# Patient Record
Sex: MALE | Race: WHITE | NOT HISPANIC OR LATINO | Employment: FULL TIME | URBAN - METROPOLITAN AREA
[De-identification: names, ages, dates, MRNs, and addresses within clinical notes are randomized per-mention and may not be internally consistent; named-entity substitution may affect disease eponyms.]

---

## 2020-04-15 ENCOUNTER — OFFICE VISIT (OUTPATIENT)
Dept: URGENT CARE | Facility: CLINIC | Age: 25
End: 2020-04-15
Payer: COMMERCIAL

## 2020-04-15 VITALS
DIASTOLIC BLOOD PRESSURE: 76 MMHG | TEMPERATURE: 98.3 F | RESPIRATION RATE: 12 BRPM | OXYGEN SATURATION: 98 % | HEIGHT: 70 IN | HEART RATE: 63 BPM | SYSTOLIC BLOOD PRESSURE: 122 MMHG | WEIGHT: 174 LBS | BODY MASS INDEX: 24.91 KG/M2

## 2020-04-15 DIAGNOSIS — H10.9 CONJUNCTIVITIS OF RIGHT EYE, UNSPECIFIED CONJUNCTIVITIS TYPE: ICD-10-CM

## 2020-04-15 DIAGNOSIS — R06.00 DYSPNEA, UNSPECIFIED TYPE: ICD-10-CM

## 2020-04-15 DIAGNOSIS — F41.8 ANXIETY ABOUT HEALTH: ICD-10-CM

## 2020-04-15 PROCEDURE — 99203 OFFICE O/P NEW LOW 30 MIN: CPT | Performed by: PHYSICIAN ASSISTANT

## 2020-04-15 RX ORDER — ALBUTEROL SULFATE 90 UG/1
2 AEROSOL, METERED RESPIRATORY (INHALATION) EVERY 6 HOURS PRN
Qty: 8.5 G | Refills: 0 | Status: SHIPPED | OUTPATIENT
Start: 2020-04-15

## 2020-04-15 RX ORDER — POLYMYXIN B SULFATE AND TRIMETHOPRIM 1; 10000 MG/ML; [USP'U]/ML
1 SOLUTION OPHTHALMIC 4 TIMES DAILY
Qty: 1 BOTTLE | Refills: 0 | Status: SHIPPED | OUTPATIENT
Start: 2020-04-15 | End: 2020-04-20

## 2020-04-15 ASSESSMENT — ENCOUNTER SYMPTOMS: SHORTNESS OF BREATH: 1

## 2020-04-15 NOTE — PROGRESS NOTES
Subjective:      Jalen Singh is a 24 y.o. male who presents with Eye Problem; Asthma; and Shortness of Breath    PMH: Reviewed with patient/family member/EPIC.    MEDS: Reviewed with patient/family member/EPIC.   Current Outpatient Medications: none  ALLERGIES: No Known Allergies  SURGHX: History reviewed. No pertinent surgical history.  SOCHX:  reports that he has never smoked. He has never used smokeless tobacco. He reports current alcohol use. He reports current drug use. Drug: Marijuana.  FH: Reviewed with patient, not pertinent to this visit.             Patient presents with:  Eye Problem: drainage from right eye x 3 days. Has been using some old drops with little relief. Pt does not wear contact lenses.     Asthma and some intermittent Shortness of Breath after starting a new medication 3-4 weeks ago.  Pt took 2 doses only, stopped that medicine completely and was not switched to any other medication in its place.  Pt does have a history of anxiety but this does not feel the same. Pt denies swelling to lips, nose, tongue throat or rash.  Pt is almost out of his albuterol.     Pt was tested for COVID 3 days ago by health department (negative) due to the duration of his SOB. PT is not currently working, but one roommate is working. Pt has no known +COVID exposure.     Pt has not tried any otc allergy medications but does admit some seasonal allergies.  Pt admits to smoking marijuana but figured it was not helping his symptoms so he hasn't smoked it in a few days.      Pt denies any other complaints.         Eye Problem    The right eye is affected. This is a new problem. Episode onset: 3 days. The problem occurs constantly. The problem has been gradually worsening. There was no injury mechanism. The pain is at a severity of 3/10. The pain is mild. There is no known exposure to pink eye. He does not wear contacts. Associated symptoms include an eye discharge and eye redness. Pertinent negatives include no  blurred vision, double vision, fever, foreign body sensation, nausea, photophobia or recent URI. He has tried eye drops for the symptoms. The treatment provided no relief.   Asthma   He complains of chest tightness and shortness of breath. There is no cough, difficulty breathing, frequent throat clearing, hemoptysis, sputum production or wheezing. This is a new problem. The current episode started 1 to 4 weeks ago. The problem occurs daily. The problem has been waxing and waning. Associated symptoms include dyspnea on exertion and rhinorrhea. Pertinent negatives include no appetite change, chest pain, fever, headaches, malaise/fatigue, nasal congestion, orthopnea, PND, postnasal drip, sneezing, sore throat, sweats or trouble swallowing. His symptoms are aggravated by emotional stress, minimal activity and strenuous activity. His symptoms are alleviated by beta-agonist and rest. He reports minimal improvement on treatment. His symptoms are not alleviated by anxiolytic and beta-agonist. His past medical history is significant for asthma.       Review of Systems   Constitutional: Negative for appetite change, fever and malaise/fatigue.   HENT: Positive for congestion and rhinorrhea. Negative for postnasal drip, sneezing, sore throat and trouble swallowing.    Eyes: Positive for discharge and redness. Negative for blurred vision, double vision, photophobia and pain.   Respiratory: Positive for shortness of breath. Negative for cough, hemoptysis, sputum production and wheezing.    Cardiovascular: Positive for dyspnea on exertion. Negative for chest pain and PND.   Gastrointestinal: Negative for nausea.   Neurological: Negative for headaches.   Endo/Heme/Allergies: Positive for environmental allergies.   Psychiatric/Behavioral: The patient is nervous/anxious and has insomnia.    All other systems reviewed and are negative.         Objective:     /76   Pulse 63   Temp 36.8 °C (98.3 °F)   Resp 12   Ht 1.778 m (5'  "10\")   Wt 78.9 kg (174 lb)   SpO2 98%   BMI 24.97 kg/m²      Physical Exam  Vitals signs and nursing note reviewed.   Constitutional:       General: He is not in acute distress.     Appearance: Normal appearance. He is well-developed. He is not ill-appearing, toxic-appearing or diaphoretic.   HENT:      Head: Normocephalic and atraumatic.      Right Ear: Tympanic membrane normal.      Left Ear: Tympanic membrane normal.      Nose: Nose normal.      Mouth/Throat:      Lips: Pink.      Mouth: Mucous membranes are moist.   Eyes:      General: Lids are normal. Lids are everted, no foreign bodies appreciated. Vision grossly intact. Gaze aligned appropriately. No allergic shiner or scleral icterus.        Right eye: Discharge present. No foreign body or hordeolum.         Left eye: No discharge.      Extraocular Movements: Extraocular movements intact.      Conjunctiva/sclera:      Right eye: Right conjunctiva is injected.      Left eye: Left conjunctiva is not injected.      Pupils: Pupils are equal, round, and reactive to light.   Neck:      Musculoskeletal: Normal range of motion and neck supple.      Vascular: No JVD.   Cardiovascular:      Rate and Rhythm: Normal rate and regular rhythm.      Pulses: Normal pulses.      Heart sounds: Normal heart sounds.   Pulmonary:      Effort: Pulmonary effort is normal. No respiratory distress.      Breath sounds: Normal breath sounds and air entry. No decreased air movement.   Abdominal:      Palpations: Abdomen is soft.   Musculoskeletal: Normal range of motion.   Lymphadenopathy:      Cervical: No cervical adenopathy.   Skin:     General: Skin is warm and dry.      Capillary Refill: Capillary refill takes less than 2 seconds.   Neurological:      Mental Status: He is alert and oriented to person, place, and time.      Gait: Gait normal.   Psychiatric:         Attention and Perception: Attention and perception normal.         Mood and Affect: Mood is anxious.         Speech: " Speech normal.         Behavior: Behavior is cooperative.         Thought Content: Thought content normal.         Cognition and Memory: Cognition normal.         Judgment: Judgment normal.                 Assessment/Plan:     1. Conjunctivitis of right eye, unspecified conjunctivitis type  polymixin-trimethoprim (POLYTRIM) 47530-9.1 UNIT/ML-% Solution    albuterol 108 (90 Base) MCG/ACT Aero Soln inhalation aerosol   2. Dyspnea, unspecified type  albuterol 108 (90 Base) MCG/ACT Aero Soln inhalation aerosol   3. Anxiety about health       PT can use cool/warm compress on affected area for relief of symptoms.     PT was instructed to begin a daily OTC allergy medication for relief of allergy symptoms.  Ex: allegra, claritin, zyrtec, allerclear, etc.   Pt can also take OTC benadryl at bedtime if symptoms are keeping them awake at night.     Per protocol for PUI/ISO patients, the patient was evaluated by me while I was wearing PPE.      Per CDC guidelines, patient has been instructed to self quarantine at home for at least 7 days from onset of symptoms and at least 3 full days after resolution of fever/respiratory symptoms.  PT verbalized agreement to do so.      PT should follow up with PCP in 1-2 days for re-evaluation if symptoms have not improved.  Discussed red flags and reasons to return to UC or ED.  Pt and/or family verbalized understanding of diagnosis and follow up instructions and was offered informational handout on diagnosis.  PT discharged.

## 2020-04-17 SDOH — HEALTH STABILITY: MENTAL HEALTH: HOW OFTEN DO YOU HAVE A DRINK CONTAINING ALCOHOL?: MONTHLY OR LESS

## 2020-04-17 ASSESSMENT — ENCOUNTER SYMPTOMS
RHINORRHEA: 1
WHEEZING: 0
SPUTUM PRODUCTION: 0
PHOTOPHOBIA: 0
FOREIGN BODY SENSATION: 0
NERVOUS/ANXIOUS: 1
SWEATS: 0
COUGH: 0
NAUSEA: 0
FREQUENT THROAT CLEARING: 0
HEADACHES: 0
APPETITE CHANGE: 0
EYE REDNESS: 1
SORE THROAT: 0
PND: 0
TROUBLE SWALLOWING: 0
HEMOPTYSIS: 0
EYE PAIN: 0
DYSPNEA ON EXERTION: 1
EYE DISCHARGE: 1
FEVER: 0
INSOMNIA: 1
CHEST TIGHTNESS: 1
ORTHOPNEA: 0
BLURRED VISION: 0
DIFFICULTY BREATHING: 0
DOUBLE VISION: 0

## 2020-04-17 ASSESSMENT — VISUAL ACUITY: OU: 1

## 2020-06-03 ENCOUNTER — HOSPITAL ENCOUNTER (EMERGENCY)
Facility: MEDICAL CENTER | Age: 25
End: 2020-06-03
Attending: EMERGENCY MEDICINE
Payer: COMMERCIAL

## 2020-06-03 VITALS
BODY MASS INDEX: 25.88 KG/M2 | DIASTOLIC BLOOD PRESSURE: 68 MMHG | TEMPERATURE: 97.7 F | OXYGEN SATURATION: 97 % | HEART RATE: 66 BPM | WEIGHT: 180.78 LBS | SYSTOLIC BLOOD PRESSURE: 117 MMHG | HEIGHT: 70 IN | RESPIRATION RATE: 18 BRPM

## 2020-06-03 DIAGNOSIS — S61.411A LACERATION OF RIGHT HAND INVOLVING TENDON, INITIAL ENCOUNTER: ICD-10-CM

## 2020-06-03 DIAGNOSIS — S66.921A LACERATION OF RIGHT HAND INVOLVING TENDON, INITIAL ENCOUNTER: ICD-10-CM

## 2020-06-03 PROCEDURE — 303747 HCHG EXTRA SUTURE

## 2020-06-03 PROCEDURE — 304999 HCHG REPAIR-SIMPLE/INTERMED LEVEL 1

## 2020-06-03 PROCEDURE — 99283 EMERGENCY DEPT VISIT LOW MDM: CPT

## 2020-06-03 PROCEDURE — 304217 HCHG IRRIGATION SYSTEM

## 2020-06-03 PROCEDURE — 700101 HCHG RX REV CODE 250: Performed by: EMERGENCY MEDICINE

## 2020-06-03 RX ORDER — CEPHALEXIN 500 MG/1
500 CAPSULE ORAL 4 TIMES DAILY
Qty: 28 CAP | Refills: 0 | Status: SHIPPED | OUTPATIENT
Start: 2020-06-03 | End: 2020-06-10

## 2020-06-03 RX ORDER — BUPIVACAINE HYDROCHLORIDE AND EPINEPHRINE 5; 5 MG/ML; UG/ML
10 INJECTION, SOLUTION EPIDURAL; INTRACAUDAL; PERINEURAL ONCE
Status: COMPLETED | OUTPATIENT
Start: 2020-06-03 | End: 2020-06-03

## 2020-06-03 RX ADMIN — BUPIVACAINE HYDROCHLORIDE AND EPINEPHRINE BITARTRATE 10 ML: 5; .005 INJECTION, SOLUTION EPIDURAL; INTRACAUDAL; PERINEURAL at 12:16

## 2020-06-03 NOTE — ED NOTES
ERP sutured wound and placed dressing and splint.  Patient educated on wound and dressing care, verbalized understanding.   Graft Donor Site Bandage (Optional-Leave Blank If You Don't Want In Note): Steri-strips and a pressure bandage were applied to the donor site.

## 2020-06-03 NOTE — ED PROVIDER NOTES
ED Provider Note    CHIEF COMPLAINT  Chief Complaint   Patient presents with   • T-5000 Lacerations     on sharp metal edge of steel item. Right 2nd digit. Bandaged from concentra and sent to ED. He states he is able to flex and extend index finger       HPI  Jalen Singh is a 24 y.o. male who presents evaluation of right hand laceration with tendon involvement.  The patient reports excellently cut the dorsal aspect of his right hand while at work.  It came across a sharp object.  He sustained a 1 cm transverse laceration overlying the second MCP.  There is concern about partial or complete tendon disruption as well.  He denies any numbness weakness or tingling.  Tetanus is up-to-date within the last 10 years.  Injury occurred within the last 3 hours  REVIEW OF SYSTEMS  See HPI for further details.  No numbness tingling weakness all other systems are negative.     PAST MEDICAL HISTORY  Past Medical History:   Diagnosis Date   • Depression        FAMILY HISTORY  Noncontributory    SOCIAL HISTORY  Social History     Socioeconomic History   • Marital status: Single     Spouse name: Not on file   • Number of children: Not on file   • Years of education: Not on file   • Highest education level: Not on file   Occupational History   • Not on file   Social Needs   • Financial resource strain: Not on file   • Food insecurity     Worry: Not on file     Inability: Not on file   • Transportation needs     Medical: Not on file     Non-medical: Not on file   Tobacco Use   • Smoking status: Never Smoker   • Smokeless tobacco: Never Used   Substance and Sexual Activity   • Alcohol use: Yes     Frequency: Monthly or less   • Drug use: Yes     Types: Marijuana, Inhaled     Comment: daily marijuana   • Sexual activity: Not on file   Lifestyle   • Physical activity     Days per week: Not on file     Minutes per session: Not on file   • Stress: Not on file   Relationships   • Social connections     Talks on phone: Not on file     Gets  "together: Not on file     Attends Christian service: Not on file     Active member of club or organization: Not on file     Attends meetings of clubs or organizations: Not on file     Relationship status: Not on file   • Intimate partner violence     Fear of current or ex partner: Not on file     Emotionally abused: Not on file     Physically abused: Not on file     Forced sexual activity: Not on file   Other Topics Concern   • Not on file   Social History Narrative   • Not on file     Marijuana  SURGICAL HISTORY  No past surgical history on file.    CURRENT MEDICATIONS  Home Medications     Reviewed by Jose Epstein R.N. (Registered Nurse) on 06/03/20 at 1132  Med List Status: Complete   Medication Last Dose Status   albuterol 108 (90 Base) MCG/ACT Aero Soln inhalation aerosol prn Active                ALLERGIES  No Known Allergies    PHYSICAL EXAM  VITAL SIGNS: /81   Pulse 70   Temp 36.6 °C (97.9 °F) (Temporal)   Resp 16   Ht 1.778 m (5' 10\")   Wt 82 kg (180 lb 12.4 oz)   SpO2 100%   BMI 25.94 kg/m²       Constitutional: Well developed, Well nourished, No acute distress, Non-toxic appearance.   HENT: Normocephalic, Atraumatic, Bilateral external ears normal, Oropharynx moist, No oral exudates, Nose normal.   Eyes: PERRLA, EOMI, Conjunctiva normal, No discharge.   Neck: Normal range of motion, No tenderness, Supple, No stridor.   Cardiovascular: Normal heart rate, Normal rhythm, No murmurs, No rubs, No gallops.   Thorax & Lungs: Normal breath sounds, No respiratory distress, No wheezing, No chest tenderness.   Abdomen: Bowel sounds normal, Soft, No tenderness, No masses, No pulsatile masses.   Skin: Warm, Dry, No erythema, No rash.   Back: No tenderness, No CVA tenderness.   Extremities: Jb is notable for a transverse laceration overlying the dorsal aspect of the right MCP second digit.  Two-point discrimination is normal sensation is intact.  The patient can flex and extend against resistance " however wound exploration reveals likely partial transection of the central slip of the extensor tendon   neurologic: Alert & oriented x 3, Normal motor function, Normal sensory function, No focal deficits noted.   Psychiatric: Shows      Procedure: Centimeter hand laceration repair the wound was anesthetized with a total of 4 cc of bupivacaine with epinephrine.  He was copiously irrigated with sterile saline.  Sterile prep and drape.  The wound was gently explored.  There does appear to be a partial transection of the extensor tendon without foreign body.  The overlying skin was closed with a total of 3, five-point 0 nylon sutures.  No complications    COURSE & MEDICAL DECISION MAKING  Pertinent Labs & Imaging studies reviewed. (See chart for details)  I discussed this case with Dr. Gatica with hand surgery.  He specifically recommended aggressive washout and primary skin closure and to leave the attendant alone.  I will place him in a splint and place him on Keflex.  Follow-up with orthopedics in 1 to 2 days    FINAL IMPRESSION  1.   1. Laceration of right hand involving tendon, initial encounter                Electronically signed by: Mo Weinberg M.D., 6/3/2020 12:56 PM

## 2020-06-03 NOTE — LETTER
"  FORM C-4:  EMPLOYEE’S CLAIM FOR COMPENSATION/ REPORT OF INITIAL TREATMENT  EMPLOYEE’S CLAIM - PROVIDE ALL INFORMATION REQUESTED   First Name Jalen Last Name Authier Birthdate 1995  Sex male Claim Number   Home Employee Address [address] [facility]                                     Zip  [address] Height  1.778 m (5' 10\") Weight  82 kg (180 lb 12.4 oz) N  [id number]   Mailing Employee Address [address] [facility]               Zip  [address] Telephone  [phone] (home)  Primary Language Spoken  English   Insurer   Third Party   [employer]  Employee's Occupation (Job Title) When Injury or Occupational Disease Occurred     Employer's Name [employer] Telephone [phone]    Employer Address [address] [facility] [address] Zip [address]   Date of Injury  6/3/2020       Hour of Injury  10:00 AM Date Employer Notified  6/3/2020 Last Day of Work after Injury or Occupational Disease  6/3/2020 Supervisor to Whom Injury Reported  Armando Haq   Address or Location of Accident (if applicable) [[address] [place] [facility]]   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 Be specific and answer in detail. Use additional sheet if necessary)  Cut R knuckkle for index finger on sharp piecve of steel - Winnie fuentes tendon was cut   If you believe that you have an occupational disease, when did you first have knowledge of the disability and it relationship to your employment? n/a Witnesses to the Accident  none - reported immediatley to supervisor   Nature of Injury or Occupational Disease  Laceration Part(s) of Body Injured or Affected  Finger (R), Soft Tissue, N/A    I CERTIFY THAT THE ABOVE IS TRUE AND CORRECT TO THE BEST OF MY KNOWLEDGE AND THAT I HAVE PROVIDED THIS INFORMATION IN ORDER TO OBTAIN THE BENEFITS OF [place] " [place]  I HEREBY AUTHORIZE ANY PHYSICIAN, CHIROPRACTOR, SURGEON, PRACTITIONER, OR OTHER PERSON, ANY HOSPITAL, INCLUDING [facility] OR [facility], ANY MEDICAL SERVICE ORGANIZATION, ANY INSURANCE COMPANY, OR OTHER INSTITUTION OR ORGANIZATION TO RELEASE TO EACH OTHER, ANY MEDICAL OR OTHER INFORMATION, INCLUDING BENEFITS PAID OR PAYABLE, PERTINENT TO THIS INJURY OR DISEASE, EXCEPT INFORMATION RELATIVE TO DIAGNOSIS, TREATMENT AND/OR COUNSELING FOR AIDS, PSYCHOLOGICAL CONDITIONS, ALCOHOL OR CONTROLLED SUBSTANCES, FOR WHICH I MUST GIVE SPECIFIC AUTHORIZATION.  A PHOTOSTAT OF THIS AUTHORIZATION SHALL BE AS VALID AS THE ORIGINAL.  Date       06/03/2020         Place           [facility] [place]                                                     Employee’s Signature   THIS REPORT MUST BE COMPLETED AND MAILED WITHIN 3 WORKING DAYS OF TREATMENT   Place [facility], EMERGENCY DEPT                                                                             Name of Facility [facility]   Date  6/3/2020 Diagnosis  (S61.411A,  S66.921A) Laceration of right hand involving tendon, initial encounter Is there evidence the injured employee was under the influence of alcohol and/or another controlled substance at the time of accident?   Hour  1:11 PM Description of Injury or Disease  Laceration of right hand involving tendon, initial encounter No   Treatment  Physician evaluation and treatment of right hand laceration with extensor tendon partial transection  Have you advised the patient to remain off work five days or more?         No   X-Ray Findings  Negative If Yes   From Date    To Date      From information given by the employee, together with medical evidence, can you directly connect this injury or occupational disease as job incurred? Yes If No, is employee capable of: Full Duty  No Modified Duty  Yes   Is additional medical  "care by a physician indicated? Yes  Comments:Follow-up with hand surgery If Modified Duty, Specify any Limitations / Restrictions   Use of right hand   Do you know of any previous injury or disease contributing to this condition or occupational disease? No    Date 6/3/2020 Print Doctor’s Name Mo Weinberg I certify the employer’s copy of this form was mailed on:   Address [address] [place] [id number] [phone] INSURER’S USE ONLY   Provider’s Tax ID Number [id number] Telephone Dept: [phone]    Doctor’s Signature [employer] M.D. Degree  [facility]      Form C-4 (rev.10/07)                                                                         BRIEF DESCRIPTION OF RIGHTS AND BENEFITS  (Pursuant to [place] 616C.050)    Notice of Injury or Occupational Disease (Incident Report Form C-1): If an injury or occupational disease (OD) arises out of and in the course of employment, you must provide written notice to your employer as soon as practicable, but no later than 7 days after the accident or OD. Your employer shall maintain a sufficient supply of the required forms.    Claim for Compensation (Form C-4): If medical treatment is sought, the form C-4 is available at the place of initial treatment. A completed \"Claim for Compensation\" (Form C-4) must be filed within 90 days after an accident or OD. The treating physician or chiropractor must, within 3 working days after treatment, complete and mail to the employer, the employer's insurer and third-party , the Claim for Compensation.    Medical Treatment: If you require medical treatment for your on-the-job injury or OD, you may be required to select a physician or chiropractor from a list provided by your workers’ compensation insurer, if it has contracted with an Organization for Managed Care (MCO) or Preferred Provider Organization (PPO) or providers of health care. If your employer has not entered into a contract with an MCO or PPO, you " may select a physician or chiropractor from the Panel of Physicians and Chiropractors. Any medical costs related to your industrial injury or OD will be paid by your insurer.    Temporary Total Disability (TTD): If your doctor has certified that you are unable to work for a period of at least 5 consecutive days, or 5 cumulative days in a 20-day period, or places restrictions on you that your employer does not accommodate, you may be entitled to TTD compensation.    Temporary Partial Disability (TPD): If the wage you receive upon reemployment is less than the compensation for TTD to which you are entitled, the insurer may be required to pay you TPD compensation to make up the difference. TPD can only be paid for a maximum of 24 months.    Permanent Partial Disability (PPD): When your medical condition is stable and there is an indication of a PPD as a result of your injury or OD, within 30 days, your insurer must arrange for an evaluation by a rating physician or chiropractor to determine the degree of your PPD. The amount of your PPD award depends on the date of injury, the results of the PPD evaluation and your age and wage.    Permanent Total Disability (PTD): If you are medically certified by a treating physician or chiropractor as permanently and totally disabled and have been granted a PTD status by your insurer, you are entitled to receive monthly benefits not to exceed 66 2/3% of your average monthly wage. The amount of your PTD payments is subject to reduction if you previously received a PPD award.    Vocational Rehabilitation Services: You may be eligible for vocational rehabilitation services if you are unable to return to the job due to a permanent physical impairment or permanent restrictions as a result of your injury or occupational disease.    Transportation and Per Rakan Reimbursement: You may be eligible for travel expenses and per rakan associated with medical treatment.    Reopening: You may be  able to reopen your claim if your condition worsens after claim closure.     Appeal Process: If you disagree with a written determination issued by the insurer or the insurer does not respond to your request, you may appeal to the Department of Administration, , by following the instructions contained in your determination letter. You must appeal the determination within 70 days from the date of the determination letter at [address], [address], [place], [place] [address], or [address], [address], [place], [place] [address]. If you disagree with the  decision, you may appeal to the Department of Administration, . You must file your appeal within 30 days from the date of the  decision letter at [address], [address], [place], [place] [address], or [address], [address], [place], [place] [address]. If you disagree with a decision of an , you may file a petition for judicial review with the District Court. You must do so within 30 days of the Appeal Officer’s decision. You may be represented by an  at your own expense or you may contact the [facility] for possible representation.    [place]  for Injured Workers (NAIW): If you disagree with a  decision, you may request that NAIW represent you without charge at an  Hearing. For information regarding denial of benefits, you may contact the [facility] at: [address], [address], [place] [address], [phone], or [address], [address], [place] [address], [phone]    To File a Complaint with the Division: If you wish to file a complaint with the  of the Division of Industrial Relations (DIR),  please contact the Workers’ Compensation Section, [address], [place], [place] [address], telephone [phone], or [address], [place], [place]  [address], telephone [phone].    For assistance with Workers’ Compensation Issues: You may contact the Office of the Governor Consumer Health Assistance, [address] [place] [address], [address], [place] [address], Toll Free 1-578.448.8016, Web site: http://[email or web address]., E-mail [email or web address].  D-2 (rev. 06/18)              __________________________________________________________________                                    ___06/03/2020____            Employee Name / Signature                                                                                                                            Date

## 2020-06-03 NOTE — ED TRIAGE NOTES
Chief Complaint   Patient presents with   • T-5000 Lacerations     on sharp metal edge of steel item. Right 2nd digit. Bandaged from concentra and sent to ED. He states he is able to flex and extend index finger     Ambulatory to triage for above. Unknown last tdap. Explained triage process, to waiting room. Asked to inform RN if questions or concerns arise.

## 2020-06-03 NOTE — ED NOTES
Jalen Singh discharged via ambulation with Mother.  Discharge instructions given and reviewed, patient educated to follow up with Ortho, verbalized understanding.  Prescriptions given x 1.  All personal belongings in possession.  No questions at this time.

## 2021-01-14 ENCOUNTER — HOSPITAL ENCOUNTER (OUTPATIENT)
Facility: MEDICAL CENTER | Age: 26
End: 2021-01-14
Attending: NURSE PRACTITIONER
Payer: COMMERCIAL

## 2021-01-14 ENCOUNTER — OFFICE VISIT (OUTPATIENT)
Dept: URGENT CARE | Facility: PHYSICIAN GROUP | Age: 26
End: 2021-01-14
Payer: COMMERCIAL

## 2021-01-14 VITALS
WEIGHT: 185 LBS | HEIGHT: 70 IN | OXYGEN SATURATION: 98 % | DIASTOLIC BLOOD PRESSURE: 72 MMHG | BODY MASS INDEX: 26.48 KG/M2 | HEART RATE: 68 BPM | SYSTOLIC BLOOD PRESSURE: 126 MMHG | RESPIRATION RATE: 16 BRPM | TEMPERATURE: 98.1 F

## 2021-01-14 DIAGNOSIS — Z20.822 EXPOSURE TO COVID-19 VIRUS: ICD-10-CM

## 2021-01-14 DIAGNOSIS — R05.9 COUGH: ICD-10-CM

## 2021-01-14 DIAGNOSIS — R53.81 MALAISE AND FATIGUE: ICD-10-CM

## 2021-01-14 DIAGNOSIS — Z20.822 EXPOSURE TO COVID-19 VIRUS: Primary | ICD-10-CM

## 2021-01-14 DIAGNOSIS — R53.83 MALAISE AND FATIGUE: ICD-10-CM

## 2021-01-14 DIAGNOSIS — R09.81 NASAL CONGESTION: ICD-10-CM

## 2021-01-14 LAB
COVID ORDER STATUS COVID19: NORMAL
SARS-COV-2 RNA RESP QL NAA+PROBE: NOTDETECTED
SPECIMEN SOURCE: NORMAL

## 2021-01-14 PROCEDURE — 99203 OFFICE O/P NEW LOW 30 MIN: CPT | Mod: CS | Performed by: NURSE PRACTITIONER

## 2021-01-14 PROCEDURE — U0005 INFEC AGEN DETEC AMPLI PROBE: HCPCS

## 2021-01-14 PROCEDURE — U0003 INFECTIOUS AGENT DETECTION BY NUCLEIC ACID (DNA OR RNA); SEVERE ACUTE RESPIRATORY SYNDROME CORONAVIRUS 2 (SARS-COV-2) (CORONAVIRUS DISEASE [COVID-19]), AMPLIFIED PROBE TECHNIQUE, MAKING USE OF HIGH THROUGHPUT TECHNOLOGIES AS DESCRIBED BY CMS-2020-01-R: HCPCS

## 2021-01-14 PROCEDURE — C9803 HOPD COVID-19 SPEC COLLECT: HCPCS

## 2021-01-14 RX ORDER — MODAFINIL 100 MG/1
TABLET ORAL
COMMUNITY
Start: 2021-01-14 | End: 2021-10-28

## 2021-01-14 ASSESSMENT — ENCOUNTER SYMPTOMS
EYE PAIN: 0
HEADACHES: 0
WHEEZING: 0
FOCAL WEAKNESS: 0
COUGH: 1
RHINORRHEA: 0
SWEATS: 0
SHORTNESS OF BREATH: 0
NERVOUS/ANXIOUS: 0
VOMITING: 0
BACK PAIN: 0
DEPRESSION: 0
CONSTIPATION: 0
DIARRHEA: 0
MYALGIAS: 1
HEARTBURN: 0
NECK PAIN: 0
FEVER: 0
HEMOPTYSIS: 0
SORE THROAT: 0
SENSORY CHANGE: 0
PALPITATIONS: 0
ABDOMINAL PAIN: 0
NAUSEA: 0
DIZZINESS: 0
CLAUDICATION: 0
CHILLS: 0

## 2021-01-14 ASSESSMENT — COPD QUESTIONNAIRES: COPD: 0

## 2021-01-14 NOTE — PROGRESS NOTES
Subjective:      Jalen Singh is a 25 y.o. male who presents with Cough (sx started 3 days ago with cough, fatigue, nasal congestion and drainage.         Patient presents to urgent care with complaint of cough, fatigue, nasal congestion and drainage which started 3 days ago.  Patient denies any change in his sense of taste or smell.  He denies any nausea vomiting or diarrhea.       Cough  This is a new problem. Episode onset: In the past 3 days. The problem has been gradually worsening. The problem occurs every few minutes. The cough is non-productive. Associated symptoms include myalgias and nasal congestion. Pertinent negatives include no chest pain, chills, ear congestion, ear pain, fever, headaches, heartburn, hemoptysis, rash, rhinorrhea, sore throat, shortness of breath, sweats or wheezing. Nothing aggravates the symptoms. Risk factors: None. He has tried nothing for the symptoms. The treatment provided no relief. There is no history of asthma, COPD, emphysema or pneumonia.       Review of Systems   Constitutional: Positive for malaise/fatigue. Negative for chills and fever.   HENT: Positive for congestion. Negative for ear pain, rhinorrhea and sore throat.    Eyes: Negative for pain.   Respiratory: Positive for cough. Negative for hemoptysis, shortness of breath and wheezing.    Cardiovascular: Negative for chest pain, palpitations, claudication and leg swelling.   Gastrointestinal: Negative for abdominal pain, constipation, diarrhea, heartburn, nausea and vomiting.   Genitourinary: Negative for dysuria.   Musculoskeletal: Positive for myalgias. Negative for back pain, joint pain and neck pain.   Skin: Negative for rash.   Neurological: Negative for dizziness, sensory change, focal weakness and headaches.   Psychiatric/Behavioral: Negative for depression. The patient is not nervous/anxious.           Objective:     /72 (BP Location: Left arm, Patient Position: Sitting, BP Cuff Size: Adult)   Pulse  "68   Temp 36.7 °C (98.1 °F) (Temporal)   Resp 16   Ht 1.778 m (5' 10\")   Wt 83.9 kg (185 lb)   SpO2 98%   BMI 26.54 kg/m²      Physical Exam  Vitals signs reviewed.   Constitutional:       General: He is not in acute distress.     Appearance: Normal appearance. He is ill-appearing.   HENT:      Head: Normocephalic.      Right Ear: Tympanic membrane, ear canal and external ear normal.      Left Ear: Tympanic membrane, ear canal and external ear normal.      Nose: Congestion present. No rhinorrhea.      Mouth/Throat:      Mouth: Mucous membranes are moist.      Pharynx: Posterior oropharyngeal erythema present. No oropharyngeal exudate.   Eyes:      Extraocular Movements: Extraocular movements intact.      Conjunctiva/sclera: Conjunctivae normal.      Pupils: Pupils are equal, round, and reactive to light.   Neck:      Musculoskeletal: Normal range of motion and neck supple. No neck rigidity or muscular tenderness.   Cardiovascular:      Rate and Rhythm: Normal rate and regular rhythm.      Pulses: Normal pulses.      Heart sounds: Normal heart sounds. No murmur.   Pulmonary:      Effort: Pulmonary effort is normal. No respiratory distress.      Breath sounds: Normal breath sounds. No wheezing, rhonchi or rales.   Abdominal:      General: Abdomen is flat.      Palpations: Abdomen is soft.   Musculoskeletal: Normal range of motion.   Lymphadenopathy:      Cervical: No cervical adenopathy.   Skin:     General: Skin is warm and dry.      Capillary Refill: Capillary refill takes less than 2 seconds.   Neurological:      Mental Status: He is alert and oriented to person, place, and time.   Psychiatric:         Mood and Affect: Mood normal.         Behavior: Behavior normal.                 Assessment/Plan:         1. Exposure to COVID-19 virus  COVID/SARS CoV-2 PCR   2. Cough  COVID/SARS CoV-2 PCR   3. Malaise and fatigue  COVID/SARS CoV-2 PCR   4. Nasal congestion  COVID/SARS CoV-2 PCR     May take over-the-counter " Ibuprofen 400-600 mg every 8 hours as needed for pain    Await Covid results.    Discussed with patient that symptoms are suspicious for Covid-19 infection vs other viral illness.  Vitals are stable at this time.  Patient is advised to self isolate and provided with self isolation precautions AVS information.  Discussed when to return to urgent care or ER including for worsening shortness of breath.  Patient verbalized understanding and has no additional questions.    Plan of care, medications and treatments reviewed with patient and or guardian.  Patient and or guardian voices understanding and agrees with the instructions provided. After visit summary reviewed with patient. Patient and or guardian understand the parameters for reevaluation and ER precautions discussed.     Please note that this dictation was created using voice recognition software. I have made every reasonable attempt to correct obvious errors, but I expect that there are errors of grammar and possibly content that I did not discover before finalizing the note.

## 2021-10-28 ENCOUNTER — OFFICE VISIT (OUTPATIENT)
Dept: MEDICAL GROUP | Facility: PHYSICIAN GROUP | Age: 26
End: 2021-10-28
Payer: COMMERCIAL

## 2021-10-28 VITALS
HEART RATE: 72 BPM | SYSTOLIC BLOOD PRESSURE: 110 MMHG | WEIGHT: 179 LBS | TEMPERATURE: 98.2 F | HEIGHT: 70 IN | BODY MASS INDEX: 25.62 KG/M2 | OXYGEN SATURATION: 97 % | DIASTOLIC BLOOD PRESSURE: 72 MMHG

## 2021-10-28 DIAGNOSIS — F33.1 MODERATE EPISODE OF RECURRENT MAJOR DEPRESSIVE DISORDER (HCC): ICD-10-CM

## 2021-10-28 DIAGNOSIS — F41.0 SEVERE ANXIETY WITH PANIC: ICD-10-CM

## 2021-10-28 PROBLEM — F12.90 MARIJUANA USE, CONTINUOUS: Status: ACTIVE | Noted: 2021-10-28

## 2021-10-28 PROCEDURE — 99213 OFFICE O/P EST LOW 20 MIN: CPT | Performed by: STUDENT IN AN ORGANIZED HEALTH CARE EDUCATION/TRAINING PROGRAM

## 2021-10-28 SDOH — ECONOMIC STABILITY: TRANSPORTATION INSECURITY
IN THE PAST 12 MONTHS, HAS LACK OF RELIABLE TRANSPORTATION KEPT YOU FROM MEDICAL APPOINTMENTS, MEETINGS, WORK OR FROM GETTING THINGS NEEDED FOR DAILY LIVING?: NO

## 2021-10-28 SDOH — ECONOMIC STABILITY: FOOD INSECURITY: WITHIN THE PAST 12 MONTHS, THE FOOD YOU BOUGHT JUST DIDN'T LAST AND YOU DIDN'T HAVE MONEY TO GET MORE.: NEVER TRUE

## 2021-10-28 SDOH — ECONOMIC STABILITY: FOOD INSECURITY: WITHIN THE PAST 12 MONTHS, YOU WORRIED THAT YOUR FOOD WOULD RUN OUT BEFORE YOU GOT MONEY TO BUY MORE.: NEVER TRUE

## 2021-10-28 SDOH — ECONOMIC STABILITY: TRANSPORTATION INSECURITY
IN THE PAST 12 MONTHS, HAS THE LACK OF TRANSPORTATION KEPT YOU FROM MEDICAL APPOINTMENTS OR FROM GETTING MEDICATIONS?: NO

## 2021-10-28 SDOH — ECONOMIC STABILITY: HOUSING INSECURITY
IN THE LAST 12 MONTHS, WAS THERE A TIME WHEN YOU DID NOT HAVE A STEADY PLACE TO SLEEP OR SLEPT IN A SHELTER (INCLUDING NOW)?: NO

## 2021-10-28 SDOH — ECONOMIC STABILITY: HOUSING INSECURITY: IN THE LAST 12 MONTHS, HOW MANY PLACES HAVE YOU LIVED?: 1

## 2021-10-28 SDOH — HEALTH STABILITY: PHYSICAL HEALTH: ON AVERAGE, HOW MANY DAYS PER WEEK DO YOU ENGAGE IN MODERATE TO STRENUOUS EXERCISE (LIKE A BRISK WALK)?: 0 DAYS

## 2021-10-28 SDOH — HEALTH STABILITY: MENTAL HEALTH
STRESS IS WHEN SOMEONE FEELS TENSE, NERVOUS, ANXIOUS, OR CAN'T SLEEP AT NIGHT BECAUSE THEIR MIND IS TROUBLED. HOW STRESSED ARE YOU?: VERY MUCH

## 2021-10-28 SDOH — ECONOMIC STABILITY: INCOME INSECURITY: IN THE LAST 12 MONTHS, WAS THERE A TIME WHEN YOU WERE NOT ABLE TO PAY THE MORTGAGE OR RENT ON TIME?: NO

## 2021-10-28 SDOH — HEALTH STABILITY: PHYSICAL HEALTH: ON AVERAGE, HOW MANY MINUTES DO YOU ENGAGE IN EXERCISE AT THIS LEVEL?: 0 MIN

## 2021-10-28 SDOH — ECONOMIC STABILITY: TRANSPORTATION INSECURITY
IN THE PAST 12 MONTHS, HAS LACK OF TRANSPORTATION KEPT YOU FROM MEETINGS, WORK, OR FROM GETTING THINGS NEEDED FOR DAILY LIVING?: NO

## 2021-10-28 SDOH — ECONOMIC STABILITY: INCOME INSECURITY: HOW HARD IS IT FOR YOU TO PAY FOR THE VERY BASICS LIKE FOOD, HOUSING, MEDICAL CARE, AND HEATING?: NOT HARD AT ALL

## 2021-10-28 ASSESSMENT — SOCIAL DETERMINANTS OF HEALTH (SDOH)
DO YOU BELONG TO ANY CLUBS OR ORGANIZATIONS SUCH AS CHURCH GROUPS UNIONS, FRATERNAL OR ATHLETIC GROUPS, OR SCHOOL GROUPS?: NO
HOW OFTEN DO YOU ATTENT MEETINGS OF THE CLUB OR ORGANIZATION YOU BELONG TO?: NEVER
HOW MANY DRINKS CONTAINING ALCOHOL DO YOU HAVE ON A TYPICAL DAY WHEN YOU ARE DRINKING: 1 OR 2
IN A TYPICAL WEEK, HOW MANY TIMES DO YOU TALK ON THE PHONE WITH FAMILY, FRIENDS, OR NEIGHBORS?: MORE THAN THREE TIMES A WEEK
HOW OFTEN DO YOU HAVE SIX OR MORE DRINKS ON ONE OCCASION: LESS THAN MONTHLY
IN A TYPICAL WEEK, HOW MANY TIMES DO YOU TALK ON THE PHONE WITH FAMILY, FRIENDS, OR NEIGHBORS?: MORE THAN THREE TIMES A WEEK
ARE YOU MARRIED, WIDOWED, DIVORCED, SEPARATED, NEVER MARRIED, OR LIVING WITH A PARTNER?: NEVER MARRIED
DO YOU BELONG TO ANY CLUBS OR ORGANIZATIONS SUCH AS CHURCH GROUPS UNIONS, FRATERNAL OR ATHLETIC GROUPS, OR SCHOOL GROUPS?: NO
WITHIN THE PAST 12 MONTHS, YOU WORRIED THAT YOUR FOOD WOULD RUN OUT BEFORE YOU GOT THE MONEY TO BUY MORE: NEVER TRUE
HOW OFTEN DO YOU GET TOGETHER WITH FRIENDS OR RELATIVES?: MORE THAN THREE TIMES A WEEK
HOW OFTEN DO YOU GET TOGETHER WITH FRIENDS OR RELATIVES?: MORE THAN THREE TIMES A WEEK
HOW OFTEN DO YOU ATTENT MEETINGS OF THE CLUB OR ORGANIZATION YOU BELONG TO?: NEVER
HOW OFTEN DO YOU HAVE A DRINK CONTAINING ALCOHOL: MONTHLY OR LESS
HOW OFTEN DO YOU ATTEND CHURCH OR RELIGIOUS SERVICES?: NEVER
HOW HARD IS IT FOR YOU TO PAY FOR THE VERY BASICS LIKE FOOD, HOUSING, MEDICAL CARE, AND HEATING?: NOT HARD AT ALL
HOW OFTEN DO YOU ATTEND CHURCH OR RELIGIOUS SERVICES?: NEVER
ARE YOU MARRIED, WIDOWED, DIVORCED, SEPARATED, NEVER MARRIED, OR LIVING WITH A PARTNER?: NEVER MARRIED

## 2021-10-28 ASSESSMENT — PATIENT HEALTH QUESTIONNAIRE - PHQ9
CLINICAL INTERPRETATION OF PHQ2 SCORE: 6
5. POOR APPETITE OR OVEREATING: 3 - NEARLY EVERY DAY
SUM OF ALL RESPONSES TO PHQ QUESTIONS 1-9: 23

## 2021-10-28 ASSESSMENT — LIFESTYLE VARIABLES
HOW MANY STANDARD DRINKS CONTAINING ALCOHOL DO YOU HAVE ON A TYPICAL DAY: 1 OR 2
HOW OFTEN DO YOU HAVE SIX OR MORE DRINKS ON ONE OCCASION: LESS THAN MONTHLY
HOW OFTEN DO YOU HAVE A DRINK CONTAINING ALCOHOL: MONTHLY OR LESS

## 2021-10-28 NOTE — ASSESSMENT & PLAN NOTE
Chronic. Followed by a psychologist (Joey Herrera). Not interest in psychiatrist at this point.    Last suicide attempt 8 years ago (hanging-rope broke). Lives with friends who have firearms, but they are properly stored. Passive SI without plan for years.    Previously tried: Zoloft, buspirone, xanax, Wellbutrin, rispiradone, ziprasidone, trazadone, lexapro, adderall, modafinil and mirtazapine. Some others as a teenager.

## 2021-10-28 NOTE — PROGRESS NOTES
"Subjective:     CC: Establish care    HISTORY OF THE PRESENT ILLNESS: Patient is a 25 y.o. male. This pleasant patient is here today to establish care. His/her prior PCP was MONO Rocha.    Used albuterol when the smoke was bad, but didn't help much-has a just in case now but not using. Received after he had a reaction to medication (ziprasidone, April of last of year).     Moderate episode of recurrent major depressive disorder (HCC)  Chronic. Followed by a psychologist (Joey Herrera). Not interest in psychiatrist at this point.    Last suicide attempt 8 years ago (hanging-rope broke). Lives with friends who have firearms, but they are properly stored. Passive SI without plan for years.    Previously tried: Zoloft, buspirone, xanax, Wellbutrin, rispiradone, ziprasidone, trazadone, lexapro, adderall, modafinil and mirtazapine. Some others as a teenager.    Severe anxiety with panic  Chronic. Last winter was having panic every other day. Worse in social situations. 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    Current Outpatient Medications Ordered in Epic   Medication Sig Dispense Refill   • albuterol 108 (90 Base) MCG/ACT Aero Soln inhalation aerosol Inhale 2 Puffs by mouth every 6 hours as needed for Shortness of Breath. 8.5 g 0     No current Epic-ordered facility-administered medications on file.     Health Maintenance: Patient refused tetanus and flu vaccines today, will check on childhood vaccines to see if they are due.    ROS:   Gen: no fevers/chills, no changes in weight  ENT: no sore throat  Pulm: no sob, no cough  CV: no chest pain, no palpitations  GI: no nausea/vomiting, no diarrhea  MSk: no myalgias  Skin: no rash  Neuro: no headaches      Objective:     Exam: /72   Pulse 72   Temp 36.8 °C (98.2 °F)   Ht 1.778 m (5' 10\")   Wt 81.2 kg (179 lb)   SpO2 97%  Body mass index is 25.68 kg/m².    General: Well developed, well nourished in no acute " distress.  Head: Normocephalic and atraumatic.  Eyes: Conjunctivae and extraocular motions are normal. Pupils are equal, round. No scleral icterus.   Ears:  External ears unremarkable. Tympanic membranes clear and intact.  Nose: Nares patent. No discharge.  Mouth/Throat: Oropharynx is clear and moist. Posterior pharynx without erythema or exudates.  Neck: Supple. Thyroid is not enlarged.  Pulmonary: Clear to ausculation.  Normal effort. No rales, ronchi, or wheezing.  Cardiovascular: Regular rate and rhythm without murmur.  Abdomen: Soft, nontender, nondistended. Normal bowel sounds. No HSM.  Neurologic: No gross/focal deficits. Normal gait.   Lymph: No cervical or supraclavicular lymph nodes are palpable  Skin: Warm and dry.  No obvious lesions.  Musculoskeletal: No extremity cyanosis, clubbing, or edema.  Psych:  Appearance: Clothing and grooming normal.  Mood: 'depressed and anxious'  Behavior: Late approx 6min to his scheduled appt. No psychomotor abnormalities or impulsivity. Attention is good. Patient is pleasant and cooperative.   Eye contact: good   Affect: normal  Speech/thought content: Normal speech pattern. Normal insight and reasoning, no evidence of psychotic process. Reports suicidal thoughts, no plans or action.    Assessment & Plan:   25 y.o. male with the following -    1. Moderate episode of recurrent major depressive disorder (HCC)  2. Severe anxiety with panic  These are both chronic conditions for which she was previously followed by psychiatry and trialed on multiple medications without improvement. PHQ-9 23. He is followed by a psychologist currently and would like to continue with him but declines psychiatry referral for now.  No active suicidal plans, no safety concerns at this time and discussed ER precautions and safety measures to prevent.    I spent a total of 23 minutes with record review, exam, communication with the patient, and documentation of this encounter.    Return in about 3  months (around 1/28/2022), or if symptoms worsen or fail to improve.    Please note that this dictation was created using voice recognition software. I have made every reasonable attempt to correct obvious errors, but I expect that there are errors of grammar and possibly content that I did not discover before finalizing the note.

## 2021-10-28 NOTE — PATIENT INSTRUCTIONS
Check on varicella and HPV series.     TDaP (due every 10years) and flu due (can return anytime as a walkin.

## 2021-10-28 NOTE — ASSESSMENT & PLAN NOTE
Chronic. Last winter was having panic every other day. Worse in social situations. 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

## 2021-12-09 ENCOUNTER — OFFICE VISIT (OUTPATIENT)
Dept: MEDICAL GROUP | Facility: PHYSICIAN GROUP | Age: 26
End: 2021-12-09
Payer: COMMERCIAL

## 2021-12-09 VITALS
DIASTOLIC BLOOD PRESSURE: 68 MMHG | HEIGHT: 70 IN | BODY MASS INDEX: 25.91 KG/M2 | TEMPERATURE: 97.4 F | SYSTOLIC BLOOD PRESSURE: 120 MMHG | WEIGHT: 181 LBS | RESPIRATION RATE: 16 BRPM | HEART RATE: 111 BPM | OXYGEN SATURATION: 96 %

## 2021-12-09 DIAGNOSIS — J06.9 UPPER RESPIRATORY TRACT INFECTION, UNSPECIFIED TYPE: ICD-10-CM

## 2021-12-09 PROBLEM — F12.90 MARIJUANA USE, CONTINUOUS: Status: RESOLVED | Noted: 2021-10-28 | Resolved: 2021-12-09

## 2021-12-09 PROCEDURE — 99213 OFFICE O/P EST LOW 20 MIN: CPT | Performed by: STUDENT IN AN ORGANIZED HEALTH CARE EDUCATION/TRAINING PROGRAM

## 2021-12-09 RX ORDER — BENZONATATE 100 MG/1
100 CAPSULE ORAL 3 TIMES DAILY PRN
Qty: 60 CAPSULE | Refills: 0 | Status: SHIPPED | OUTPATIENT
Start: 2021-12-09 | End: 2022-08-25

## 2021-12-09 NOTE — PROGRESS NOTES
"Subjective:     CC: Cough and congestion since Sunday    HPI:   Jalen presents today cough and congestion since Sunday.    Patient is vaccinated to COVID-19, received G[place] in May 2021, has not received booster.  Not vaccinated to influenza, declined prior.    Patient reports no sick contacts but on Sunday developed a sore throat that progressed with chest and nasal congestion.  The cough started on Monday, not productive.  He has been having some headaches, no body aches.  Taking DayQuil and ibuprofen with some relief.  States that he had burning anterior chest pain after coughing fits yesterday, denies chest pain today.  States that when he got out of the shower the other day he did have some shortness of breath, not having shortness of breath otherwise.  No diarrhea, abdominal pain, fever, chills or loss of taste and smell.  Patient reports that he took 2 home Covid test which were negative yesterday.    Current Outpatient Medications Ordered in Epic   Medication Sig Dispense Refill   • benzonatate (TESSALON) 100 MG Cap Take 1 Capsule by mouth 3 times a day as needed for Cough. 60 Capsule 0   • albuterol 108 (90 Base) MCG/ACT Aero Soln inhalation aerosol Inhale 2 Puffs by mouth every 6 hours as needed for Shortness of Breath. 8.5 g 0     No current Epic-ordered facility-administered medications on file.     ROS:  See HPI    Objective:     Exam:  /68 (BP Location: Left arm, Patient Position: Sitting, BP Cuff Size: Adult)   Pulse (!) 111   Temp 36.3 °C (97.4 °F) (Temporal)   Resp 16   Ht 1.778 m (5' 10\")   Wt 82.1 kg (181 lb)   SpO2 96%   BMI 25.97 kg/m²  Body mass index is 25.97 kg/m².    Physical Exam:  Constitutional: Well-developed and well-nourished. No acute distress.   Skin: Skin is warm and dry. No rash noted.  Head: Atraumatic without lesions.  Eyes: Conjunctivae and extraocular motions are normal. Pupils are equal, round. No scleral icterus.   Ears:  External ears unremarkable. Tympanic " membranes clear and intact.  Nose: Nares patent.  Turbinates slightly erythematous with mild edema, clear discharge.  Mouth/Throat: Oropharynx is clear and moist. Posterior pharynx without erythema or exudates.  Tonsils are +1 bilaterally without exudates.  Neck: Supple, trachea midline. Normal range of motion. No thyromegaly present.  Shotty nontender cervical lymphadenopathy bilaterally, otherwise none.  Cardiovascular: Regular rate and rhythm, S1 and S2 without murmur, rubs, or gallops.  Lungs: Normal inspiratory effort, CTA bilaterally, no wheezes/rhonchi/rales  Extremities: No cyanosis, clubbing, erythema, nor edema.  Neurological: Alert and oriented x 3. No gross/focal deficits.  Psychiatric:  Behavior, mood, and affect are appropriate.    Assessment & Plan:     26 y.o. male with the following -     1. Upper respiratory tract infection, unspecified type  This is an acute condition.  Exam reassuring without findings suggestive of bacterial infection, heart rate normalized on auscultation less than 100.  Discussed anticipatory guidance for viral infections and return precautions.  Recommended using over-the-counter medications for symptomatic relief, prefer medications that are not combinations of multiple but rather medications that treat certain complaints (example Robitussin-DM preferred over DayQuil).  Okay to take Tylenol/ibuprofen as needed and advised saline rinses for congestion.  Divided with Tessalon Perles if cough not improved with over-the-counter medications.  Declined PCR Covid test today.  - benzonatate (TESSALON) 100 MG Cap; Take 1 Capsule by mouth 3 times a day as needed for Cough.  Dispense: 60 Capsule; Refill: 0    Return if symptoms worsen or fail to improve.    Please note that this dictation was created using voice recognition software. I have made every reasonable attempt to correct obvious errors, but I expect that there are errors of grammar and possibly content that I did not discover  before finalizing the note.

## 2022-08-25 ENCOUNTER — OFFICE VISIT (OUTPATIENT)
Dept: MEDICAL GROUP | Facility: PHYSICIAN GROUP | Age: 27
End: 2022-08-25
Payer: COMMERCIAL

## 2022-08-25 VITALS
DIASTOLIC BLOOD PRESSURE: 70 MMHG | WEIGHT: 182 LBS | SYSTOLIC BLOOD PRESSURE: 118 MMHG | HEIGHT: 70 IN | TEMPERATURE: 97.8 F | BODY MASS INDEX: 26.05 KG/M2 | HEART RATE: 66 BPM | OXYGEN SATURATION: 96 %

## 2022-08-25 DIAGNOSIS — Z23 NEED FOR VACCINATION: ICD-10-CM

## 2022-08-25 DIAGNOSIS — Z00.01 ENCOUNTER FOR ANNUAL GENERAL MEDICAL EXAMINATION WITH ABNORMAL FINDINGS IN ADULT: ICD-10-CM

## 2022-08-25 DIAGNOSIS — L29.0 ANAL PRURITUS: ICD-10-CM

## 2022-08-25 DIAGNOSIS — Z00.00 HEALTH CARE MAINTENANCE: ICD-10-CM

## 2022-08-25 DIAGNOSIS — F33.41 RECURRENT MAJOR DEPRESSIVE DISORDER, IN PARTIAL REMISSION (HCC): ICD-10-CM

## 2022-08-25 DIAGNOSIS — Z11.3 ENCOUNTER FOR SPECIAL SCREENING EXAMINATION FOR INFECTION WITH PREDOMINANTLY SEXUAL MODE OF TRANSMISSION: ICD-10-CM

## 2022-08-25 DIAGNOSIS — N48.89 PENILE PAPULES: ICD-10-CM

## 2022-08-25 PROCEDURE — 99213 OFFICE O/P EST LOW 20 MIN: CPT | Mod: 25 | Performed by: STUDENT IN AN ORGANIZED HEALTH CARE EDUCATION/TRAINING PROGRAM

## 2022-08-25 PROCEDURE — 90471 IMMUNIZATION ADMIN: CPT | Performed by: STUDENT IN AN ORGANIZED HEALTH CARE EDUCATION/TRAINING PROGRAM

## 2022-08-25 PROCEDURE — 90651 9VHPV VACCINE 2/3 DOSE IM: CPT | Performed by: STUDENT IN AN ORGANIZED HEALTH CARE EDUCATION/TRAINING PROGRAM

## 2022-08-25 NOTE — PATIENT INSTRUCTIONS
COVID booster can be given at a pharmacy     Anal itching  Warm water baths 2-3 times a day, pat the area dry after  Avoid wiping harshly  Can apply hydrocortisone 1% cream externally to anal area twice daily for up to 2 weeks in conjunction with a barrier cream (eg, zinc oxide); do not use hydrocortisone for >2 weeks (may thin the skin)

## 2022-08-28 PROBLEM — N48.89 PENILE PAPULES: Status: ACTIVE | Noted: 2022-08-28

## 2022-08-28 NOTE — PROGRESS NOTES
Subjective:     CC:   Chief Complaint   Patient presents with    Dyslipidemia     check    Sexually Transmitted Diseases     Would like to get screening done for STD       HPI:   Jalen Singh is a 26 y.o. male who presents for an annual exam.     Would like lab work for HLD given fmhx, working on improving diet. Followed by therapist weekly still. Desires STI testing, used condoms prior. Has new penile papules that are not otherwise bothersome, was able to scratch one off. Also c/o chronic anal pruritis, mostly at night.    He  has a past medical history of Anxiety, Depression, and Marijuana use, continuous (10/28/2021).  He  has a past surgical history that includes dental extraction(s).  Family History   Problem Relation Age of Onset    No Known Problems Mother     Hyperlipidemia Father     No Known Problems Sister     Brain Cancer Paternal Uncle     Arthritis Maternal Grandmother     Cancer Maternal Grandfather         Passed away when i was a baby    Colon Cancer Neg Hx     Heart Disease Neg Hx      Social History     Tobacco Use    Smoking status: Never    Smokeless tobacco: Never   Vaping Use    Vaping Use: Former    Start date: 10/28/2015    Quit date: 10/28/2016   Substance Use Topics    Alcohol use: Yes     Comment: A few times a month-2-3 beers if that    Drug use: Yes     Frequency: 5.0 times per week     Types: Marijuana, Inhaled     Comment: quit in 10/2021       Patient Active Problem List    Diagnosis Date Noted    Anal pruritus 08/25/2022    Recurrent major depressive disorder, in partial remission (HCC) 10/28/2021    Severe anxiety with panic 10/28/2021       Current Outpatient Medications   Medication Sig Dispense Refill    albuterol 108 (90 Base) MCG/ACT Aero Soln inhalation aerosol Inhale 2 Puffs by mouth every 6 hours as needed for Shortness of Breath. 8.5 g 0     No current facility-administered medications for this visit.    (including changes today)  Allergies: Ziprasidone    Review of  "Systems   Constitutional: Negative for fever, chills and malaise/fatigue.   HENT: Negative for congestion.    Eyes: Negative for pain.   Respiratory: Negative for cough and shortness of breath.    Cardiovascular: Negative for leg swelling.   Gastrointestinal: Negative for nausea, vomiting, abdominal pain and diarrhea.   Genitourinary: Negative for dysuria and hematuria.   Skin: Negative for rash.   Neurological: Negative for dizziness, focal weakness and headaches.   Endo/Heme/Allergies: Does not bleed easily.   Psychiatric/Behavioral: Still with depression, no SI.  The patient is not nervous/anxious.      Objective:     /70 (BP Location: Left arm, Patient Position: Sitting, BP Cuff Size: Adult)   Pulse 66   Temp 36.6 °C (97.8 °F) (Temporal)   Ht 1.778 m (5' 10\")   Wt 82.6 kg (182 lb)   SpO2 96%   BMI 26.11 kg/m²   Body mass index is 26.11 kg/m².  Wt Readings from Last 4 Encounters:   08/25/22 82.6 kg (182 lb)   12/09/21 82.1 kg (181 lb)   10/28/21 81.2 kg (179 lb)   01/14/21 83.9 kg (185 lb)     Physical Exam:  Constitutional: Well-developed and well-nourished. Not diaphoretic. No distress.   Skin: Skin is warm and dry.  Head: Atraumatic without lesions.  Eyes: Conjunctivae and extraocular motions are normal. Pupils are equal, round. No scleral icterus.   Mouth/Throat: Wearing mask.  Neck: Supple, trachea midline.  Cardiovascular: Regular rate and rhythm, S1 and S2 without murmur, rubs, or gallops.    Lungs: Effort normal. Clear to auscultation throughout. No adventitious sounds. No CVA tenderness.  Abdomen: Soft, non tender, and without distention. Active bowel sounds in all four quadrants. No rebound, guarding, masses or HSM.  : Genitalia: normal circumcised penis, no urethral discharge, slightly lighter than skin toned tiny (1mm) papules scattered on shaft.  Anus: No mass or hemorrhoids or bleeding. Perianal excoriations. Single <5mm skin toned papule.  Extremities: No cyanosis, clubbing, erythema, " nor edema.  Musculoskeletal: All major joints AROM full in all directions without pain.  Neurological: Alert and oriented x 3. No gross or focal deficits.   Psychiatric:  Behavior, mood, and affect are appropriate.    A chaperone was offered to the patient during today's exam. Chaperone name: Cait Gunderson MA was present.    Assessment and Plan:     1. Encounter for annual general medical examination with abnormal findings in adult  HCM: Reviewed with patient.  Immunizations discussed/recommended.  Age-appropriate anticipatory guidance discussed.    2. Health care maintenance  - Comp Metabolic Panel; Future  - TSH WITH REFLEX TO FT4; Future  - Lipid Profile; Future  - VITAMIN D,25 HYDROXY (DEFICIENCY); Future  - CBC WITH DIFFERENTIAL; Future    3. Encounter for special screening examination for infection with predominantly sexual mode of transmission  Requested by patient, aware of importance of condom use.  - Chlamydia/GC, PCR (Urine); Future  - HIV AG/AB COMBO ASSAY SCREENING; Future  - T.PALLIDUM AB EIA; Future  - HEP C VIRUS ANTIBODY; Future    4. Anal pruritus  Chronic. Advised sitz baths, gentle wiping, avoiding scratching and trial of OTC skin barrier as well as short course of hydrocortisone 1% cream BID. If not improved, would consider checking for pin worm given that worse at night.    5. Penile papules  Chronic, benign appearing. May be early genital warts-too soon to tell. F/u if concerns, enlarging.     6. Recurrent major depressive disorder, in partial remission (HCC)  Chronic, no safety concerns. He desires to continue with therapist.     7. Need for vaccination  - Gardasil 9    Follow-up: Return in about 6 months (around 2/25/2023), or if symptoms worsen or fail to improve.

## 2024-09-03 ENCOUNTER — TELEPHONE (OUTPATIENT)
Dept: HEALTH INFORMATION MANAGEMENT | Facility: OTHER | Age: 29
End: 2024-09-03

## 2024-10-01 ENCOUNTER — APPOINTMENT (OUTPATIENT)
Dept: MEDICAL GROUP | Facility: LAB | Age: 29
End: 2024-10-01

## 2024-10-01 VITALS
DIASTOLIC BLOOD PRESSURE: 62 MMHG | TEMPERATURE: 97.1 F | BODY MASS INDEX: 26.34 KG/M2 | OXYGEN SATURATION: 100 % | SYSTOLIC BLOOD PRESSURE: 118 MMHG | WEIGHT: 184 LBS | HEIGHT: 70 IN | RESPIRATION RATE: 20 BRPM | HEART RATE: 79 BPM

## 2024-10-01 DIAGNOSIS — Z23 NEED FOR VACCINATION: ICD-10-CM

## 2024-10-01 DIAGNOSIS — F33.1 MODERATE EPISODE OF RECURRENT MAJOR DEPRESSIVE DISORDER (HCC): ICD-10-CM

## 2024-10-01 DIAGNOSIS — A63.0 GENITAL WARTS: ICD-10-CM

## 2024-10-01 PROCEDURE — 90715 TDAP VACCINE 7 YRS/> IM: CPT

## 2024-10-01 PROCEDURE — 3078F DIAST BP <80 MM HG: CPT | Performed by: STUDENT IN AN ORGANIZED HEALTH CARE EDUCATION/TRAINING PROGRAM

## 2024-10-01 PROCEDURE — 99214 OFFICE O/P EST MOD 30 MIN: CPT | Mod: 25 | Performed by: STUDENT IN AN ORGANIZED HEALTH CARE EDUCATION/TRAINING PROGRAM

## 2024-10-01 PROCEDURE — 90651 9VHPV VACCINE 2/3 DOSE IM: CPT

## 2024-10-01 PROCEDURE — 3074F SYST BP LT 130 MM HG: CPT | Performed by: STUDENT IN AN ORGANIZED HEALTH CARE EDUCATION/TRAINING PROGRAM

## 2024-10-01 PROCEDURE — 90471 IMMUNIZATION ADMIN: CPT

## 2024-10-01 RX ORDER — IMIQUIMOD 12.5 MG/.25G
CREAM TOPICAL
Qty: 24 EACH | Refills: 1 | Status: SHIPPED | OUTPATIENT
Start: 2024-10-01

## 2024-10-01 RX ORDER — IMIQUIMOD 12.5 MG/.25G
CREAM TOPICAL
Refills: 0 | Status: CANCELLED | OUTPATIENT
Start: 2024-10-01

## 2024-10-01 ASSESSMENT — PATIENT HEALTH QUESTIONNAIRE - PHQ9
9. THOUGHTS THAT YOU WOULD BE BETTER OFF DEAD, OR OF HURTING YOURSELF: NOT AT ALL
6. FEELING BAD ABOUT YOURSELF - OR THAT YOU ARE A FAILURE OR HAVE LET YOURSELF OR YOUR FAMILY DOWN: NOT AL ALL
8. MOVING OR SPEAKING SO SLOWLY THAT OTHER PEOPLE COULD HAVE NOTICED. OR THE OPPOSITE, BEING SO FIGETY OR RESTLESS THAT YOU HAVE BEEN MOVING AROUND A LOT MORE THAN USUAL: NOT AT ALL
5. POOR APPETITE OR OVEREATING: 1 - SEVERAL DAYS
1. LITTLE INTEREST OR PLEASURE IN DOING THINGS: NOT AT ALL
5. POOR APPETITE OR OVEREATING: NOT AT ALL
2. FEELING DOWN, DEPRESSED, IRRITABLE, OR HOPELESS: NOT AT ALL
7. TROUBLE CONCENTRATING ON THINGS, SUCH AS READING THE NEWSPAPER OR WATCHING TELEVISION: NOT AT ALL
SUM OF ALL RESPONSES TO PHQ QUESTIONS 1-9: 13
3. TROUBLE FALLING OR STAYING ASLEEP OR SLEEPING TOO MUCH: NOT AT ALL
SUM OF ALL RESPONSES TO PHQ9 QUESTIONS 1 AND 2: 0
4. FEELING TIRED OR HAVING LITTLE ENERGY: NOT AT ALL
SUM OF ALL RESPONSES TO PHQ QUESTIONS 1-9: 0
CLINICAL INTERPRETATION OF PHQ2 SCORE: 5

## 2024-10-01 ASSESSMENT — ENCOUNTER SYMPTOMS
COUGH: 0
SHORTNESS OF BREATH: 0
ABDOMINAL PAIN: 0
WEIGHT LOSS: 0
INSOMNIA: 1
NERVOUS/ANXIOUS: 1
CHILLS: 0
DEPRESSION: 1
FEVER: 0
DIARRHEA: 0
VOMITING: 0
NAUSEA: 0

## 2025-04-15 ENCOUNTER — OFFICE VISIT (OUTPATIENT)
Dept: MEDICAL GROUP | Facility: LAB | Age: 30
End: 2025-04-15
Payer: COMMERCIAL

## 2025-04-15 VITALS
WEIGHT: 185.5 LBS | HEIGHT: 70 IN | BODY MASS INDEX: 26.56 KG/M2 | DIASTOLIC BLOOD PRESSURE: 58 MMHG | TEMPERATURE: 97.9 F | HEART RATE: 79 BPM | RESPIRATION RATE: 16 BRPM | SYSTOLIC BLOOD PRESSURE: 122 MMHG

## 2025-04-15 DIAGNOSIS — L43.9 LICHEN PLANUS: ICD-10-CM

## 2025-04-15 DIAGNOSIS — F33.1 MODERATE EPISODE OF RECURRENT MAJOR DEPRESSIVE DISORDER (HCC): ICD-10-CM

## 2025-04-15 DIAGNOSIS — Z23 NEED FOR VACCINATION: ICD-10-CM

## 2025-04-15 DIAGNOSIS — L29.3 GENITAL PRURITUS: ICD-10-CM

## 2025-04-15 DIAGNOSIS — L30.9 HAND DERMATITIS: ICD-10-CM

## 2025-04-15 DIAGNOSIS — Z86.59 HISTORY OF BIPOLAR DISORDER: ICD-10-CM

## 2025-04-15 DIAGNOSIS — F41.0 SEVERE ANXIETY WITH PANIC: ICD-10-CM

## 2025-04-15 PROCEDURE — 90651 9VHPV VACCINE 2/3 DOSE IM: CPT | Performed by: STUDENT IN AN ORGANIZED HEALTH CARE EDUCATION/TRAINING PROGRAM

## 2025-04-15 PROCEDURE — 3078F DIAST BP <80 MM HG: CPT | Performed by: STUDENT IN AN ORGANIZED HEALTH CARE EDUCATION/TRAINING PROGRAM

## 2025-04-15 PROCEDURE — 3074F SYST BP LT 130 MM HG: CPT | Performed by: STUDENT IN AN ORGANIZED HEALTH CARE EDUCATION/TRAINING PROGRAM

## 2025-04-15 PROCEDURE — 90471 IMMUNIZATION ADMIN: CPT | Performed by: STUDENT IN AN ORGANIZED HEALTH CARE EDUCATION/TRAINING PROGRAM

## 2025-04-15 PROCEDURE — 99214 OFFICE O/P EST MOD 30 MIN: CPT | Mod: 25 | Performed by: STUDENT IN AN ORGANIZED HEALTH CARE EDUCATION/TRAINING PROGRAM

## 2025-04-15 RX ORDER — FLUOCINONIDE 0.5 MG/G
1 CREAM TOPICAL 2 TIMES DAILY
Qty: 30 G | Refills: 3 | Status: SHIPPED | OUTPATIENT
Start: 2025-04-15

## 2025-04-15 ASSESSMENT — ANXIETY QUESTIONNAIRES
7. FEELING AFRAID AS IF SOMETHING AWFUL MIGHT HAPPEN: SEVERAL DAYS
2. NOT BEING ABLE TO STOP OR CONTROL WORRYING: NEARLY EVERY DAY
5. BEING SO RESTLESS THAT IT IS HARD TO SIT STILL: NEARLY EVERY DAY
GAD7 TOTAL SCORE: 17
1. FEELING NERVOUS, ANXIOUS, OR ON EDGE: NEARLY EVERY DAY
4. TROUBLE RELAXING: NEARLY EVERY DAY
3. WORRYING TOO MUCH ABOUT DIFFERENT THINGS: NEARLY EVERY DAY
6. BECOMING EASILY ANNOYED OR IRRITABLE: SEVERAL DAYS

## 2025-04-15 ASSESSMENT — PATIENT HEALTH QUESTIONNAIRE - PHQ9
SUM OF ALL RESPONSES TO PHQ QUESTIONS 1-9: 14
CLINICAL INTERPRETATION OF PHQ2 SCORE: 6
5. POOR APPETITE OR OVEREATING: 3 - NEARLY EVERY DAY

## 2025-04-15 NOTE — PATIENT INSTRUCTIONS
Use tepid water/gentle soaps without fragrance. Limit direct soap use to soiled areas, groin/hands/feet to reduce drying out skin. Purdy use of emollients (creams) especially after bathing (cetaphil cream is great-green top tub). Topical steroids for flares (<14 days) but limit to reduce possible side effects of long term use.     Could also consider using Aquaphor at night with cotton gloves.

## 2025-04-15 NOTE — PROGRESS NOTES
Verbal consent was acquired by the patient to use [employer] ambient listening note generation during this visit Yes.    Subjective:     Chief Complaint   Patient presents with    Follow-Up     Bumps in genital area  Immunizations  Anxiety        HPI:     History of Present Illness  The patient is a 29-year-old male who presents for evaluation of bumps in the genital area, immunizations, and anxiety.    He reports no alterations in the appearance of the small bumps on his genitalia. Additional bumps, distinct in appearance and causing itching, have been observed. These bumps are located on the underside of his right testicle, with a few scattered around a larger one on the top left side. He also notes a few bumps on the underside of his foreskin and a sizable patch on the inside of his right buttock. The bumps can become painful, particularly after itching, leading to soreness. A red spot on the right side of his penis head appeared 2 to 3 months ago and has not resolved. No recent exposure to infections or unprotected intercourse is reported. The red spot on the right side of his groin is expanding, while a similar spot on the left side began as a dot. There is no pain during urination, but the redness can become sore. Imiquimod was last used 6 to 7 weeks ago, not applied to the current area of concern-this is not where the current lesions are. Treatment with two antifungal medications was attempted without success on the new lesions. Butenafine hydrochloride cream 1% was used for 2 weeks without alleviating the itch. Miconazole cream 2% was then used twice daily for 5 weeks, providing some relief from the itch.    Increased anxiety has been experienced recently, attributed to stressors pushing him out of his comfort zone. He is currently seeking new employment due to a lack of opportunities at his current job, which has been a source of stress. A panic attack occurred after submitting a job application, which is  unusual for him. Old Xanax medication for panic attacks was not used. He began to feel panicky today after receiving an email about a job interview. He believes he can manage his anxiety without medication but has noticed a worsening of his general anxiety over the past few months. Medication is being considered as a potential treatment option. Medication has been avoided for some time now but was taken in the past, likely since he was a teenager. The last medication use was in 2020, primarily antidepressants rather than antianxiety medications. Risperidone, ziprasidone, trazodone, Lexapro, Adderall, modafinil, sertraline, bupropion, buspirone, and alprazolam have been tried. Depression worsened with Zoloft and Wellbutrin. Diagnosed with manic depression, he saw a therapist for 2.5 years before seeing a psychiatrist who prescribed medications. Lamictal was taken, and he was told he had bipolar disorder. Euphoria or excessively high feelings are not experienced. Talk therapy was engaged in for 2.5 years, and cognitive behavioral therapy was also tried. He often feels unwell but cannot identify a specific trigger. A severe panic attack occurred a few days ago, lasting approximately 20 minutes, during which he hyperventilated and had to lie down.  This resolved on its own.  He prefers to avoid the alprazolam even though it is a low dose because it makes him tired.  Medication for panic attacks is not being sought as they are infrequent. A habit of biting his lip when anxious is noted.    He is due for his third HPV vaccine and wishes to receive it today.    Eczema on his hands recently cleared up after a breakout that lasted 3 to 4 weeks. 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 Allergic reactions to anything are not believed to be occurring. Hands are washed frequently, but cream is not applied afterward.     Review of Systems   Constitutional:   "Negative for chills, fever, malaise/fatigue and weight loss.   Respiratory:  Negative for cough and shortness of breath.    Cardiovascular:  Negative for chest pain.   Gastrointestinal:  Negative for abdominal pain, diarrhea, nausea and vomiting.   Skin:  Positive for itching and rash.       Objective:     Exam:  /58 (BP Location: Left arm, Patient Position: Sitting, BP Cuff Size: Adult)   Pulse 79   Temp 36.6 °C (97.9 °F) (Temporal)   Resp 16   Ht 1.778 m (5' 10\")   Wt 84.1 kg (185 lb 8 oz)   BMI 26.62 kg/m²  Body mass index is 26.62 kg/m².    Physical Exam  Vitals reviewed. Exam conducted with a chaperone present (Monse Palacios MA).   Constitutional:       Appearance: Normal appearance.   HENT:      Head: Normocephalic and atraumatic.      Mouth/Throat:      Mouth: Mucous membranes are moist.      Pharynx: No oropharyngeal exudate or posterior oropharyngeal erythema.      Comments: No oral lesions.  Pulmonary:      Effort: Pulmonary effort is normal.   Skin:     General: Skin is warm and dry.      Comments: Flat polygonal violet papules/plaque on scrotum mostly, few on penile shaft.  Dry flat plaques on palms of hands.   Neurological:      General: No focal deficit present.      Mental Status: He is alert and oriented to person, place, and time.      Comments: Appearance: Clothing and grooming normal.  Mood: Anxious  Behavior: No psychomotor abnormalities or impulsivity. Attention is good. Patient is pleasant and cooperative.   Eye contact: good   Affect: normal  Speech/thought content: Normal speech pattern. Normal insight and reasoning, no evidence of psychotic process. Denies suicidal or homicidal thoughts.       Depression Screening    Little interest or pleasure in doing things?  3 - nearly every day   Feeling down, depressed , or hopeless? 3 - nearly every day   Trouble falling or staying asleep, or sleeping too much?  1 - several days   Feeling tired or having little energy?  1 - several days "   Poor appetite or overeating?  3 - nearly every day   Feeling bad about yourself - or that you are a failure or have let yourself or your family down? 3 - nearly every day   Trouble concentrating on things, such as reading the newspaper or watching television? 0 - not at all   Moving or speaking so slowly that other people could have noticed.  Or the opposite - being so fidgety or restless that you have been moving around a lot more than usual?  0 - not at all   Thoughts that you would be better off dead, or of hurting yourself?  0 - not at all   Patient Health Questionnaire Score: 14       If depressive symptoms identified deferred to follow up visit unless specifically addressed in assesment and plan.    Interpretation of PHQ-9 Total Score   Score Severity   1-4 No Depression   5-9 Mild Depression   10-14 Moderate Depression   15-19 Moderately Severe Depression   20-27 Severe Depression    STACEY-7 Questionnaire    Feeling nervous, anxious, or on edge: Nearly every day  Not being able to sop or control worrying: Nearly every day  Worrying too much about different things: Nearly every day  Trouble relaxing: Nearly every day  Being so restless that it's hard to sit still: Nearly every day  Becoming easily annoyed or irritable: Several days  Feeling afraid as if something awful might happen: Several days  Total: 17    Interpretation of STACEY 7 Total Score   Score Severity :  0-4 No Anxiety   5-9 Mild Anxiety  10-14 Moderate Anxiety  15-21 Severe Anxiety    Assessment & Plan:     29 y.o. male with the following -     1. Severe anxiety with panic  2. Moderate episode of recurrent major depressive disorder (HCC)  3. History of bipolar disorder  Chronic.  Patient in the past has trialed multiple medications with either lack of efficacy or side effects.  Does have a reported history of bipolar.  Patient feels comfortable with deferring medication at this time and establishing with a psychiatrist given his past history.  We  did discuss considering propranolol for situational anxiety but he declined any medication at this time.  - A referral to a psychiatrist has been initiated for further evaluation and management.  - Advised to consider non-pharmacological options such as cognitive behavioral therapy-declines referral for therapy at this time.  - Referral to Behavioral Health    4. Lichen planus  5. Genital pruritus  Chronic.   - The rash appears consistent with lichen planus.  - A referral to a dermatologist has been initiated for further evaluation and management.  - A high-potency topical steroid has been prescribed for application twice daily for a duration of up 2 weeks, discussed SE with prolonged use.  - Referral to Dermatology  - fluocinonide (LIDEX) 0.05 % Cream; Apply 1 Application topically 2 times a day. Hands/genitals  Dispense: 30 g; Refill: 3    6. Hand dermatitis  Chronic, improving per patient.   - Advised to avoid using excessively hot or cold water and to use fragrance-free soap.  - Recommended the use of Cetaphil cream after washing and suggested the use of cotton gloves at night under Aquaphor.  - Topical steroid for flares.  - fluocinonide (LIDEX) 0.05 % Cream; Apply 1 Application topically 2 times a day. Hands/genitals  Dispense: 30 g; Refill: 3    7. Need for vaccination  - Gardasil 9    Return if symptoms worsen or fail to improve.    Please note that this dictation was created using voice recognition software. I have made every reasonable attempt to correct obvious errors, but I expect that there are errors of grammar and possibly content that I did not discover before finalizing the note.

## 2025-04-16 NOTE — Clinical Note
REFERRAL APPROVAL NOTICE         Sent on April 16, 2025                   Jalen Singh  [address]  [place] [address]                   Dear Mr. Singh,    After a careful review of the medical information and benefit coverage, [facility] has processed your referral. See below for additional details.    If applicable, you must be actively enrolled with your insurance for coverage of the authorized service. If you have any questions regarding your coverage, please contact your insurance directly.    REFERRAL INFORMATION   Referral #:  [id number]  Referred-To Department    Referred-By Provider:  Behavioral Health    Alanis Rocha D.O.   Behavioral Health Outpatient      [address]  [place] [address]  [phone] [address] [facility] [address]  [place] [id number] [phone]    Referral Start Date:  04/15/2025  Referral End Date:   04/15/2026             SCHEDULING  If you do not already have an appointment, please call [phone] to make an appointment.     MORE INFORMATION  If you do not already have a [employer] account, sign up at: [email or web address]  You can access your medical information, make appointments, see lab results, billing information, and more.  If you have questions regarding this referral, please contact  the [facility] Referrals department at:             [phone]. Monday - Friday 8:00AM - 5:00PM.     Sincerely,    [facility]

## 2025-04-17 ASSESSMENT — ENCOUNTER SYMPTOMS
NAUSEA: 0
FEVER: 0
WEIGHT LOSS: 0
CHILLS: 0
VOMITING: 0
COUGH: 0
SHORTNESS OF BREATH: 0
DIARRHEA: 0
ABDOMINAL PAIN: 0

## 2025-04-21 NOTE — Clinical Note
Member Name: Jalen Singh   Member Number: [id number]   Reference Number: [id number]   Approved Services: Consultation   Approved Service Dates: 04/15/2025 - 04/15/2026   Requesting Provider: Alanis Rocha   Requested Provider: [facility]     Dear Jalen Singh:     The following medical service(s) requested by Alanis Rocha have been approved:    Procedure Code Procedure Code Name Requested Quantity Approved Quantity Status   21517 (CPT®) CT OFFICE/OUTPATIENT NEW MODERATE MDM 45 MINUTES 1 1 Authorized   39516 (CPT®) CT OFFICE/OUTPATIENT ESTABLISHED MOD MDM 30 MIN 5 5 Authorized       Approved Quantity means the number of visits approved for medication treatments and/or medical services.    The services should be provided by [facility] no later than 04/15/2026. Please contact the provider listed below with any questions.     Provider Information:  [facility]  [phone]    Your plan benefit may require a deductible, co-payment or coinsurance for these services. This authorization does not guarantee [facility] will pay the claim for services that you receive. Payment by [facility] for these services is subject to the terms of your Evidence of Coverage or Summary Plan Description, your eligibility at the time of service, and confirmation of benefit coverage.    For any questions or additional information, please contact Customer Service:    [facility]  Customer Service: [phone] or toll free [phone]  TTY users dial: [phone]   Call Center Hours: Mon - Fri 7 AM to 8 PM PST   Office Hours: Mon - Fri 8 AM to 5 PM [facility]   E-mail: [email or web address]   Website: [email or web address]       This information is available for free in other languages. Please contact Customer Service at the phone number above for more information. [facility] complies with applicable Federal civil rights laws  and does not discriminate on the basis of race, color, national origin, age, disability or sex.      Sincerely,     Healthcare Utilization Management Department     Cc: [facility]   Alanis Rocha

## 2025-05-15 ENCOUNTER — APPOINTMENT (OUTPATIENT)
Dept: MEDICAL GROUP | Facility: LAB | Age: 30
End: 2025-05-15
Payer: COMMERCIAL

## 2025-05-21 NOTE — Clinical Note
REFERRAL APPROVAL NOTICE         Sent on May 21, 2025                   Jalen Singh  [address]  [place] [address]                   Dear Mr. Singh,    After a careful review of the medical information and benefit coverage, [facility] has processed your referral. See below for additional details.    If applicable, you must be actively enrolled with your insurance for coverage of the authorized service. If you have any questions regarding your coverage, please contact your insurance directly.    REFERRAL INFORMATION   Referral #:  [id number]  Referred-To Department    Referred-By Provider:  Dermatology    Alanis Rocha D.O.   D[facility]      [address]  [place] [address]  [phone] [address]  [place] [id number] [phone]    Referral Start Date:  04/15/2025  Referral End Date:   04/15/2026             SCHEDULING  If you do not already have an appointment, please call [phone] to make an appointment.     MORE INFORMATION  If you do not already have a [employer] account, sign up at: [email or web address]  You can access your medical information, make appointments, see lab results, billing information, and more.  If you have questions regarding this referral, please contact  the [facility] Referrals department at:             [phone]. Monday - Friday 8:00AM - 5:00PM.     Sincerely,    [facility]